# Patient Record
Sex: MALE | Race: WHITE | NOT HISPANIC OR LATINO | Employment: FULL TIME | URBAN - METROPOLITAN AREA
[De-identification: names, ages, dates, MRNs, and addresses within clinical notes are randomized per-mention and may not be internally consistent; named-entity substitution may affect disease eponyms.]

---

## 2019-08-09 ENCOUNTER — HOSPITAL ENCOUNTER (EMERGENCY)
Facility: HOSPITAL | Age: 34
Discharge: HOME | End: 2019-08-09
Attending: EMERGENCY MEDICINE

## 2019-08-09 VITALS
DIASTOLIC BLOOD PRESSURE: 104 MMHG | RESPIRATION RATE: 18 BRPM | WEIGHT: 290 LBS | HEART RATE: 84 BPM | OXYGEN SATURATION: 97 % | TEMPERATURE: 98.1 F | SYSTOLIC BLOOD PRESSURE: 168 MMHG

## 2019-08-09 DIAGNOSIS — M54.50 LOW BACK PAIN: Primary | ICD-10-CM

## 2019-08-09 PROCEDURE — 99284 EMERGENCY DEPT VISIT MOD MDM: CPT | Performed by: EMERGENCY MEDICINE

## 2019-08-09 PROCEDURE — 96372 THER/PROPH/DIAG INJ SC/IM: CPT

## 2019-08-09 PROCEDURE — 99283 EMERGENCY DEPT VISIT LOW MDM: CPT

## 2019-08-09 RX ORDER — METHOCARBAMOL 500 MG/1
500 TABLET, FILM COATED ORAL ONCE
Status: COMPLETED | OUTPATIENT
Start: 2019-08-09 | End: 2019-08-09

## 2019-08-09 RX ORDER — LIDOCAINE 50 MG/G
1 PATCH TOPICAL ONCE
Status: DISCONTINUED | OUTPATIENT
Start: 2019-08-09 | End: 2019-08-09 | Stop reason: HOSPADM

## 2019-08-09 RX ORDER — METHOCARBAMOL 500 MG/1
500 TABLET, FILM COATED ORAL 2 TIMES DAILY
Qty: 20 TABLET | Refills: 0 | Status: SHIPPED | OUTPATIENT
Start: 2019-08-09

## 2019-08-09 RX ORDER — KETOROLAC TROMETHAMINE 30 MG/ML
15 INJECTION, SOLUTION INTRAMUSCULAR; INTRAVENOUS ONCE
Status: COMPLETED | OUTPATIENT
Start: 2019-08-09 | End: 2019-08-09

## 2019-08-09 RX ORDER — NAPROXEN 500 MG/1
500 TABLET ORAL 2 TIMES DAILY WITH MEALS
Qty: 30 TABLET | Refills: 0 | Status: SHIPPED | OUTPATIENT
Start: 2019-08-09

## 2019-08-09 RX ADMIN — KETOROLAC TROMETHAMINE 15 MG: 30 INJECTION, SOLUTION INTRAMUSCULAR at 17:35

## 2019-08-09 RX ADMIN — LIDOCAINE 1 PATCH: 50 PATCH CUTANEOUS at 17:35

## 2019-08-09 RX ADMIN — METHOCARBAMOL TABLETS 500 MG: 500 TABLET, COATED ORAL at 17:35

## 2019-08-09 NOTE — ED PROVIDER NOTES
History  Chief Complaint   Patient presents with    Back Pain     2d ago, at work, heard a "pop in his back" which has gotten worse  C/O lower back pain     27 yo man presents with 3 days of right lower back pain  Started after bending over  Electric shock down posterior right leg to ankle  History of bulging disc  Denies fever, lower extremity weakness, difficulty urinating, saddle anesthesia, blood thinners, chronic steroids, history of traumatic fracture, IV drug abuse  Not relieved with tylenol, motrin, aleve  No f/c/n/v/d  Back Pain   Associated symptoms: no abdominal pain, no chest pain, no dysuria, no fever and no headaches        None       History reviewed  No pertinent past medical history  History reviewed  No pertinent surgical history  History reviewed  No pertinent family history  I have reviewed and agree with the history as documented  Social History     Tobacco Use    Smoking status: Current Every Day Smoker     Types: Cigarettes    Smokeless tobacco: Never Used   Substance Use Topics    Alcohol use: Not Currently    Drug use: Not Currently        Review of Systems   Constitutional: Negative for appetite change, chills, diaphoresis, fatigue and fever  HENT: Negative for congestion, rhinorrhea and sore throat  Respiratory: Negative for apnea, cough, choking, chest tightness, shortness of breath, wheezing and stridor  Cardiovascular: Negative for chest pain, palpitations and leg swelling  Gastrointestinal: Negative for abdominal distention, abdominal pain, constipation, diarrhea, nausea and vomiting  Genitourinary: Negative for dysuria and hematuria  Musculoskeletal: Positive for back pain  Negative for neck pain and neck stiffness  Skin: Negative for pallor, rash and wound  Neurological: Negative for dizziness, light-headedness and headaches  Psychiatric/Behavioral: Negative for behavioral problems and confusion     All other systems reviewed and are negative  Physical Exam  ED Triage Vitals   Temperature Pulse Respirations Blood Pressure SpO2   08/09/19 1601 08/09/19 1601 08/09/19 1601 08/09/19 1601 08/09/19 1601   98 1 °F (36 7 °C) 84 18 (!) 168/104 97 %      Temp src Heart Rate Source Patient Position - Orthostatic VS BP Location FiO2 (%)   -- -- -- -- --             Pain Score       08/09/19 1559       Worst Possible Pain             Orthostatic Vital Signs  Vitals:    08/09/19 1601   BP: (!) 168/104   Pulse: 84       Physical Exam   Constitutional: He is oriented to person, place, and time  He appears well-developed and well-nourished  No distress  HENT:   Head: Normocephalic and atraumatic  Eyes: Pupils are equal, round, and reactive to light  Conjunctivae and EOM are normal  No scleral icterus  Neck: Normal range of motion  Neck supple  Cardiovascular: Normal rate, regular rhythm and normal heart sounds  No murmur heard  Pulmonary/Chest: Effort normal and breath sounds normal  No respiratory distress  He has no wheezes  Abdominal: Soft  Bowel sounds are normal  He exhibits no distension  There is no tenderness  Musculoskeletal: Normal range of motion  He exhibits no edema  No midline C, T, L-spine tenderness  Point tenderness over right lumbar paraspinal musculature  No spasm  Positive ipsalateral and contralateral straight leg raise test left side  Neurological: He is alert and oriented to person, place, and time  He has normal strength  He displays normal reflexes  No cranial nerve deficit or sensory deficit  He exhibits normal muscle tone  Coordination and gait normal  GCS eye subscore is 4  GCS verbal subscore is 5  GCS motor subscore is 6  Reflex Scores:       Patellar reflexes are 1+ on the right side and 1+ on the left side  Achilles reflexes are 1+ on the right side and 1+ on the left side  Skin: Skin is warm and dry  No rash noted  He is not diaphoretic  Psychiatric: He has a normal mood and affect   His behavior is normal    Nursing note and vitals reviewed  ED Medications  Medications   ketorolac (TORADOL) injection 15 mg (15 mg Intramuscular Given 8/9/19 1735)   methocarbamol (ROBAXIN) tablet 500 mg (500 mg Oral Given 8/9/19 1735)       Diagnostic Studies  Results Reviewed     None                 No orders to display         Procedures  Procedures        ED Course                               MDM  Number of Diagnoses or Management Options  Low back pain: new and requires workup  Diagnosis management comments: Exacerbation of lumbar spine pain  Likely sciatic in etiology  No red flags on history and exam   No indication for imaging at this time  Will treat with Toradol, Lidoderm, Robaxin  Will discharge home with scripts for Naprosyn and Robaxin  Work note provided  Patient referred to comprehensive spine program        Amount and/or Complexity of Data Reviewed  Decide to obtain previous medical records or to obtain history from someone other than the patient: yes  Obtain history from someone other than the patient: yes  Review and summarize past medical records: yes  Discuss the patient with other providers: yes  Independent visualization of images, tracings, or specimens: yes    Risk of Complications, Morbidity, and/or Mortality  Presenting problems: minimal  Diagnostic procedures: minimal  Management options: minimal    Patient Progress  Patient progress: stable      Disposition  Final diagnoses:   Low back pain     Time reflects when diagnosis was documented in both MDM as applicable and the Disposition within this note     Time User Action Codes Description Comment    8/9/2019  5:31 PM Liu Goldstein Add [M54 5] Low back pain       ED Disposition     ED Disposition Condition Date/Time Comment    Discharge Stable Fri Aug 9, 2019  5:31 PM Justice Kimball discharge to home/self care              Follow-up Information     Follow up With Specialties Details Why Contact Info    Ambrocio Jovel DO Family Medicine   [address] [address]  [phone]            Discharge Medication List as of 8/9/2019  5:34 PM      START taking these medications    Details   methocarbamol (ROBAXIN) 500 mg tablet Take 1 tablet (500 mg total) by mouth 2 (two) times a day, Starting Fri 8/9/2019, Print      naproxen (NAPROSYN) 500 mg tablet Take 1 tablet (500 mg total) by mouth 2 (two) times a day with meals, Starting Fri 8/9/2019, Print               ED Provider  Attending physically available and evaluated Jose Hassan I managed the patient along with the ED Attending      Electronically Signed by         Luc Kent MD  08/10/19 8757

## 2019-08-10 NOTE — ED ATTENDING ATTESTATION
Alyssa Gonzales DO, saw and evaluated the patient  I have discussed the patient with the resident/non-physician practitioner and agree with the resident's/non-physician practitioner's findings, Plan of Care, and MDM as documented in the resident's/non-physician practitioner's note, except where noted  All available labs and Radiology studies were reviewed  I was present for key portions of any procedure(s) performed by the resident/non-physician practitioner and I was immediately available to provide assistance  At this point I agree with the current assessment done in the Emergency Department  I have conducted an independent evaluation of this patient a history and physical is as follows:      SUBJECTIVE:   Gabrielle Busyb is a 28 y o  male who complains of an injury causing low back pain 3 day(s) ago  The pain is positional with bending or lifting, with radiation down the legs  Mechanism of injury: bending over at work  Symptoms have been acute since that time  Prior history of back problems: recurrent self limited episodes of low back pain in the past and previous herniated disc  There is no numbness in the legs  OBJECTIVE:  Vital signs as noted above  Patient appears to be in mild to moderate pain,  Lumbosacral spine area reveals no local tenderness or mass  Painful and reduced LS ROM noted  Straight leg raise is positive at 70 degrees on right, left  DTR's, motor strength and sensation normal, including heel and toe gait  Peripheral pulses are palpable  Lumbar spine X-Ray: not indicated  ASSESSMENT:   lumbar strain and herniated disc likely at L4-5    PLAN:  For acute pain, rest, intermittent application of cold packs (later, may switch to heat, but do not sleep on heating pad), analgesics and muscle relaxants are recommended  Discussed longer term treatment plan of prn NSAID's and discussed a home back care exercise program with flexion exercise routine   Proper lifting with avoidance of heavy lifting discussed  Consider Physical Therapy and XRay studies if not improving  Call or return to clinic prn if these symptoms worsen or fail to improve as anticipated        Critical Care Time  Procedures

## 2019-08-12 ENCOUNTER — TELEPHONE (OUTPATIENT)
Dept: PHYSICAL THERAPY | Facility: OTHER | Age: 34
End: 2019-08-12

## 2019-08-12 NOTE — TELEPHONE ENCOUNTER
Message left for Pt to call us back  Our ph # and hours of business provided  Waiting for return call from Pt      Referral closed

## 2023-12-28 ENCOUNTER — HOSPITAL ENCOUNTER (EMERGENCY)
Facility: HOSPITAL | Age: 38
Discharge: HOME | End: 2023-12-28
Attending: EMERGENCY MEDICINE

## 2023-12-28 VITALS
TEMPERATURE: 97.3 F | OXYGEN SATURATION: 97 % | SYSTOLIC BLOOD PRESSURE: 165 MMHG | RESPIRATION RATE: 17 BRPM | DIASTOLIC BLOOD PRESSURE: 98 MMHG | HEART RATE: 80 BPM

## 2023-12-28 DIAGNOSIS — K08.89 PAIN, DENTAL: Primary | ICD-10-CM

## 2023-12-28 PROCEDURE — 99284 EMERGENCY DEPT VISIT MOD MDM: CPT | Performed by: EMERGENCY MEDICINE

## 2023-12-28 PROCEDURE — 99282 EMERGENCY DEPT VISIT SF MDM: CPT

## 2023-12-28 PROCEDURE — 64400 NJX AA&/STRD TRIGEMINAL NRV: CPT | Performed by: EMERGENCY MEDICINE

## 2023-12-28 RX ORDER — CHLORHEXIDINE GLUCONATE ORAL RINSE 1.2 MG/ML
15 SOLUTION DENTAL 2 TIMES DAILY
Qty: 120 ML | Refills: 0 | Status: SHIPPED | OUTPATIENT
Start: 2023-12-28

## 2023-12-28 RX ORDER — CHLORHEXIDINE GLUCONATE ORAL RINSE 1.2 MG/ML
15 SOLUTION DENTAL 2 TIMES DAILY
Qty: 120 ML | Refills: 0 | Status: SHIPPED | OUTPATIENT
Start: 2023-12-28 | End: 2023-12-28

## 2023-12-28 RX ORDER — CHLORHEXIDINE GLUCONATE ORAL RINSE 1.2 MG/ML
15 SOLUTION DENTAL ONCE
Status: COMPLETED | OUTPATIENT
Start: 2023-12-28 | End: 2023-12-28

## 2023-12-28 RX ORDER — PENICILLIN V POTASSIUM 500 MG/1
500 TABLET ORAL 4 TIMES DAILY
Qty: 40 TABLET | Refills: 0 | Status: SHIPPED | OUTPATIENT
Start: 2023-12-28 | End: 2023-12-28

## 2023-12-28 RX ORDER — ROPIVACAINE HYDROCHLORIDE 5 MG/ML
15 INJECTION, SOLUTION EPIDURAL; INFILTRATION; PERINEURAL ONCE
Status: COMPLETED | OUTPATIENT
Start: 2023-12-28 | End: 2023-12-28

## 2023-12-28 RX ORDER — PENICILLIN V POTASSIUM 500 MG/1
500 TABLET ORAL 4 TIMES DAILY
Qty: 28 TABLET | Refills: 0 | Status: SHIPPED | OUTPATIENT
Start: 2023-12-28 | End: 2024-01-04

## 2023-12-28 RX ORDER — PENICILLIN V POTASSIUM 500 MG/1
500 TABLET ORAL ONCE
Status: COMPLETED | OUTPATIENT
Start: 2023-12-28 | End: 2023-12-28

## 2023-12-28 RX ADMIN — CHLORHEXIDINE GLUCONATE 15 ML: 1.2 SOLUTION ORAL at 21:16

## 2023-12-28 RX ADMIN — ROPIVACAINE HYDROCHLORIDE 15 ML: 5 INJECTION, SOLUTION EPIDURAL; INFILTRATION; PERINEURAL at 21:17

## 2023-12-28 RX ADMIN — PENICILLIN V POTASSIUM 500 MG: 500 TABLET, FILM COATED ORAL at 21:16

## 2023-12-29 NOTE — DISCHARGE INSTRUCTIONS
Start taking the penicillin and peridex as prescribed.     Follow up with dental clinic as soon as possible.     Schedule an appointment with the oral surgery team.    If you develop new or worsening symptoms, please return to the Emergency Department for further evaluation.

## 2023-12-29 NOTE — ED PROVIDER NOTES
History  Chief Complaint   Patient presents with    Dental Pain     Pt reports toothache for couple days, unable to sleep, no relief with tylenol and motrin     HPI    Patient is a 39 y/o M presenting with acute R upper dental pain. Pt states about a year ago he had a tooth chip but has been doing ok. 1 month ago he states he had an abscess in the R upper dental area which spontaneously resolved. Now for the past 2-3 days has severe R upper dental pain. There are about 3 teeth that are affected. No facial swelling, difficulty breathing. Trying tylenol/motrin, can't sleep, no pain relief. Only thing that helps is drinking water. Denies fever, chills, cp, sob, n/v/d.     Prior to Admission Medications   Prescriptions Last Dose Informant Patient Reported? Taking?   methocarbamol (ROBAXIN) 500 mg tablet   No No   Sig: Take 1 tablet (500 mg total) by mouth 2 (two) times a day   naproxen (NAPROSYN) 500 mg tablet   No No   Sig: Take 1 tablet (500 mg total) by mouth 2 (two) times a day with meals      Facility-Administered Medications: None       History reviewed. No pertinent past medical history.    History reviewed. No pertinent surgical history.    History reviewed. No pertinent family history.  I have reviewed and agree with the history as documented.    E-Cigarette/Vaping     E-Cigarette/Vaping Substances     Social History     Tobacco Use    Smoking status: Every Day     Types: Cigarettes    Smokeless tobacco: Never   Substance Use Topics    Alcohol use: Not Currently    Drug use: Not Currently        Review of Systems   Constitutional:  Negative for chills and fever.   HENT:  Positive for dental problem. Negative for ear pain and sore throat.    Eyes:  Negative for pain and visual disturbance.   Respiratory:  Negative for cough and shortness of breath.    Cardiovascular:  Negative for chest pain and palpitations.   Gastrointestinal:  Negative for abdominal pain and vomiting.   Genitourinary:  Negative for dysuria  and hematuria.   Musculoskeletal:  Negative for arthralgias and back pain.   Skin:  Negative for color change and rash.   Neurological:  Negative for seizures and syncope.   All other systems reviewed and are negative.      Physical Exam  ED Triage Vitals [12/28/23 2032]   Temperature Pulse Respirations Blood Pressure SpO2   (!) 97.3 °F (36.3 °C) 80 17 165/98 97 %      Temp Source Heart Rate Source Patient Position - Orthostatic VS BP Location FiO2 (%)   Temporal Monitor -- -- --      Pain Score       8             Orthostatic Vital Signs  Vitals:    12/28/23 2032   BP: 165/98   Pulse: 80       Physical Exam  Vitals and nursing note reviewed.   Constitutional:       General: He is not in acute distress.     Appearance: He is well-developed. He is not toxic-appearing.   HENT:      Head: Normocephalic and atraumatic.      Right Ear: External ear normal.      Left Ear: External ear normal.      Nose: Nose normal.      Mouth/Throat:      Mouth: No angioedema.      Dentition: Abnormal dentition. Dental caries present.      Pharynx: No oropharyngeal exudate or posterior oropharyngeal erythema.      Tonsils: No tonsillar exudate.     Eyes:      Extraocular Movements: Extraocular movements intact.      Conjunctiva/sclera: Conjunctivae normal.      Pupils: Pupils are equal, round, and reactive to light.   Cardiovascular:      Rate and Rhythm: Normal rate and regular rhythm.      Pulses: Normal pulses.      Heart sounds: Normal heart sounds. No murmur heard.     No friction rub. No gallop.   Pulmonary:      Effort: Pulmonary effort is normal. No respiratory distress.      Breath sounds: Normal breath sounds. No wheezing, rhonchi or rales.   Abdominal:      General: Abdomen is flat.      Palpations: Abdomen is soft.      Tenderness: There is no abdominal tenderness. There is no guarding or rebound.   Musculoskeletal:         General: Normal range of motion.      Cervical back: Normal range of motion. No rigidity.      Right  "lower leg: No edema.      Left lower leg: No edema.   Skin:     General: Skin is warm and dry.      Capillary Refill: Capillary refill takes less than 2 seconds.   Neurological:      General: No focal deficit present.      Mental Status: He is alert.   Psychiatric:         Mood and Affect: Mood normal.         ED Medications  Medications   penicillin V potassium (VEETID) tablet 500 mg (500 mg Oral Given 12/28/23 2116)   chlorhexidine (PERIDEX) 0.12 % oral rinse 15 mL (15 mL Swish & Spit Given 12/28/23 2116)   ropivacaine (NAROPIN) 0.5 % injection 15 mL (15 mL Infiltration Given by Other 12/28/23 2117)       Diagnostic Studies  Results Reviewed       None                   No orders to display         Procedures  Nerve block    Date/Time: 12/28/2023 9:38 PM    Performed by: Dev Devries MD  Authorized by: Dev Devries MD  Universal Protocol:  Consent: Verbal consent obtained.  Risks and benefits: risks, benefits and alternatives were discussed  Consent given by: patient  Time out: Immediately prior to procedure a \"time out\" was called to verify the correct patient, procedure, equipment, support staff and site/side marked as required.  Timeout called at: 12/28/2023 9:38 PM.  Patient understanding: patient states understanding of the procedure being performed  Patient consent: the patient's understanding of the procedure matches consent given  Procedure consent: procedure consent matches procedure scheduled  Patient identity confirmed: verbally with patient and arm band    Indications:     Indications:  Pain relief  Location:     Body area:  Head    Head nerve blocked: superior alveolar R.    Laterality:  Right  Pre-procedure details:     Skin preparation:  2% chlorhexidine  Procedure details (see MAR for exact dosages):     Block needle gauge:  24 G    Anesthetic injected:  Ropivacaine 0.5%    Steroid injected:  None    Additive injected:  None    Injection procedure:  Anatomic landmarks identified, anatomic " landmarks palpated, introduced needle, incremental injection and negative aspiration for blood  Post-procedure details:     Dressing:  None    Outcome:  Pain relieved    Patient tolerance of procedure:  Tolerated well, no immediate complications        ED Course                             SBIRT 20yo+      Flowsheet Row Most Recent Value   Initial Alcohol Screen: US AUDIT-C     1. How often do you have a drink containing alcohol? 0 Filed at: 12/28/2023 2032   2. How many drinks containing alcohol do you have on a typical day you are drinking?  0 Filed at: 12/28/2023 2032   3a. Male UNDER 65: How often do you have five or more drinks on one occasion? 0 Filed at: 12/28/2023 2032   Audit-C Score 0 Filed at: 12/28/2023 2032   CECILIA: How many times in the past year have you...    Used an illegal drug or used a prescription medication for non-medical reasons? Never Filed at: 12/28/2023 2032                  Medical Decision Making  39 y/o M presenting for evaluation of dental pain. VSS. No evidence of deep space infection or dental abscess. Will start penicillin and rec peridex rinses. Pt requesting dental block. Block performed with analgesia achieved. Dental clinic info given, referred for oral surgery. Discussed strict return precautions.     Risk  Prescription drug management.          Disposition  Final diagnoses:   Pain, dental     Time reflects when diagnosis was documented in both MDM as applicable and the Disposition within this note       Time User Action Codes Description Comment    12/28/2023  9:20 PM Dev Devries Add [K08.89] Pain, dental           ED Disposition       ED Disposition   Discharge    Condition   Stable    Date/Time   Thu Dec 28, 2023 2120    Comment   Timi Abebe discharge to home/self care.                   Follow-up Information       Follow up With Specialties Details Why Contact Info    Dave Stratton DO Family Medicine   [address] [place] [address]  [phone]       [facility] [place]  Schedule an appointment as soon as possible for a visit   [address] [place] [address]  [phone]            Discharge Medication List as of 12/28/2023  9:26 PM        START taking these medications    Details   chlorhexidine (PERIDEX) 0.12 % solution Apply 15 mL to the mouth or throat 2 (two) times a day, Starting Thu 12/28/2023, Normal      penicillin V potassium (VEETID) 500 mg tablet Take 1 tablet (500 mg total) by mouth 4 (four) times a day for 7 days, Starting Thu 12/28/2023, Until Thu 1/4/2024, Normal           CONTINUE these medications which have NOT CHANGED    Details   methocarbamol (ROBAXIN) 500 mg tablet Take 1 tablet (500 mg total) by mouth 2 (two) times a day, Starting Fri 8/9/2019, Print      naproxen (NAPROSYN) 500 mg tablet Take 1 tablet (500 mg total) by mouth 2 (two) times a day with meals, Starting Fri 8/9/2019, Print               PDMP Review       None             ED Provider  Attending physically available and evaluated Timi AZAM Abebe. I managed the patient along with the ED Attending.    Electronically Signed by           Dev Devries MD  12/28/23 6221

## 2023-12-30 NOTE — ED ATTENDING ATTESTATION
12/28/2023  IDre MD, saw and evaluated the patient. I have discussed the patient with the resident/non-physician practitioner and agree with the resident's/non-physician practitioner's findings, Plan of Care, and MDM as documented in the resident's/non-physician practitioner's note, except where noted. All available labs and Radiology studies were reviewed.  I was present for key portions of any procedure(s) performed by the resident/non-physician practitioner and I was immediately available to provide assistance.       At this point I agree with the current assessment done in the Emergency Department.  I have conducted an independent evaluation of this patient a history and physical is as follows:    ED Course     Impression: Dental pain (right upper jaw.  History of poor dentition)  Differential diagnosis periapical abscess, dental fracture, dental caries doubt deep space infection of head and neck doubt Ludewig's angina, doubt vascular space abscess.    Plan to perform dental block for analgesia, will treat with Pen-Vee K chlorhexidine follow-up with dental as outpatient    Patient underwent right superior alveolar nerve block tolerated procedure well without immediate complications.  Patient be discharged home on chlorhexidine and penicillin for dental pain.  Patient was counseled to take Tyle Motrin as needed for pain.  Patient was provided a list of dentists in the area at request.    Return precautions given.    Critical Care Time  Procedures

## 2024-03-29 ENCOUNTER — HOSPITAL ENCOUNTER (EMERGENCY)
Facility: HOSPITAL | Age: 39
Discharge: HOME | End: 2024-03-29
Attending: EMERGENCY MEDICINE

## 2024-03-29 VITALS
HEART RATE: 93 BPM | HEIGHT: 78 IN | TEMPERATURE: 97.4 F | OXYGEN SATURATION: 98 % | WEIGHT: 290 LBS | SYSTOLIC BLOOD PRESSURE: 183 MMHG | BODY MASS INDEX: 33.55 KG/M2 | DIASTOLIC BLOOD PRESSURE: 75 MMHG | RESPIRATION RATE: 18 BRPM

## 2024-03-29 DIAGNOSIS — M25.551 RIGHT HIP PAIN: Primary | ICD-10-CM

## 2024-03-29 PROCEDURE — 99284 EMERGENCY DEPT VISIT MOD MDM: CPT | Performed by: EMERGENCY MEDICINE

## 2024-03-29 PROCEDURE — 99283 EMERGENCY DEPT VISIT LOW MDM: CPT

## 2024-03-29 PROCEDURE — 96372 THER/PROPH/DIAG INJ SC/IM: CPT

## 2024-03-29 RX ORDER — NAPROXEN 500 MG/1
500 TABLET ORAL 2 TIMES DAILY WITH MEALS
Qty: 14 TABLET | Refills: 0 | Status: SHIPPED | OUTPATIENT
Start: 2024-03-29 | End: 2024-04-05

## 2024-03-29 RX ORDER — KETOROLAC TROMETHAMINE 30 MG/ML
15 INJECTION, SOLUTION INTRAMUSCULAR; INTRAVENOUS ONCE
Status: COMPLETED | OUTPATIENT
Start: 2024-03-29 | End: 2024-03-29

## 2024-03-29 RX ADMIN — KETOROLAC TROMETHAMINE 15 MG: 30 INJECTION, SOLUTION INTRAMUSCULAR; INTRAVENOUS at 18:02

## 2024-03-29 NOTE — DISCHARGE INSTRUCTIONS
You were seen in the ED for hip pain. Return to the ED for any worsening symptoms or new symptoms. Follow up with your primary care doctor as soon as possible.  Take tylenol for your symptoms and naproxen.

## 2024-03-29 NOTE — ED PROVIDER NOTES
History  Chief Complaint   Patient presents with    Hip Pain     Pt reports right hip pain for 1 week. Noticed right knee numbness. Pt report throwing back out the previous week.      38-year-old male patient with no past medical history presenting with right hip pain and back pain.  Patient states about 3 weeks ago started having back pain after he worked out.  States that a week ago started having right hip pain.  Dates pain radiates down for his right hip to his knee.  Patient also states that he has some numbness in his right knee.  Denies any urinary or bowel incontinence, weakness in lower extremities, numbness or tingling.  Patient states that he is able to ambulate.  Patient states that he has been taking ibuprofen and Tylenol for symptoms.  Denies any, to the back.  Denies any fevers.        Prior to Admission Medications   Prescriptions Last Dose Informant Patient Reported? Taking?   chlorhexidine (PERIDEX) 0.12 % solution   No No   Sig: Apply 15 mL to the mouth or throat 2 (two) times a day   methocarbamol (ROBAXIN) 500 mg tablet   No No   Sig: Take 1 tablet (500 mg total) by mouth 2 (two) times a day   naproxen (NAPROSYN) 500 mg tablet   No No   Sig: Take 1 tablet (500 mg total) by mouth 2 (two) times a day with meals      Facility-Administered Medications: None       History reviewed. No pertinent past medical history.    History reviewed. No pertinent surgical history.    History reviewed. No pertinent family history.  I have reviewed and agree with the history as documented.    E-Cigarette/Vaping     E-Cigarette/Vaping Substances    Nicotine Yes      Social History     Tobacco Use    Smoking status: Former     Types: Cigarettes    Smokeless tobacco: Never   Substance Use Topics    Alcohol use: Not Currently    Drug use: Not Currently        Review of Systems   Musculoskeletal:  Positive for back pain.        Right hip pain   All other systems reviewed and are negative.      Physical Exam  ED Triage  Vitals [03/29/24 1605]   Temperature Pulse Respirations Blood Pressure SpO2   (!) 97.4 °F (36.3 °C) 93 18 (!) 183/75 98 %      Temp Source Heart Rate Source Patient Position - Orthostatic VS BP Location FiO2 (%)   Temporal Monitor Sitting Left arm --      Pain Score       8             Orthostatic Vital Signs  Vitals:    03/29/24 1605   BP: (!) 183/75   Pulse: 93   Patient Position - Orthostatic VS: Sitting       Physical Exam  Vitals reviewed.   Constitutional:       Appearance: Normal appearance.   HENT:      Head: Normocephalic and atraumatic.      Nose: Nose normal.      Mouth/Throat:      Mouth: Mucous membranes are moist.      Pharynx: Oropharynx is clear.   Eyes:      Extraocular Movements: Extraocular movements intact.      Conjunctiva/sclera: Conjunctivae normal.   Cardiovascular:      Rate and Rhythm: Normal rate and regular rhythm.      Pulses: Normal pulses.      Heart sounds: Normal heart sounds.   Pulmonary:      Effort: Pulmonary effort is normal.      Breath sounds: Normal breath sounds.   Abdominal:      General: Bowel sounds are normal.      Palpations: Abdomen is soft.      Tenderness: There is no abdominal tenderness.   Musculoskeletal:         General: No tenderness (No tenderness to the right lower extremity). Normal range of motion.      Cervical back: Normal range of motion.   Skin:     General: Skin is warm and dry.   Neurological:      General: No focal deficit present.      Mental Status: He is alert and oriented to person, place, and time. Mental status is at baseline.      Motor: No weakness (5 out of 5 strength in bilateral lower extremities).         ED Medications  Medications   ketorolac (TORADOL) injection 15 mg (15 mg Intramuscular Given 3/29/24 1802)       Diagnostic Studies  Results Reviewed       None                   No orders to display         Procedures  Procedures      ED Course                             SBIRT 22yo+      Flowsheet Row Most Recent Value   Initial Alcohol  Screen: US AUDIT-C     1. How often do you have a drink containing alcohol? 0 Filed at: 03/29/2024 1607   Audit-C Score 0 Filed at: 03/29/2024 1607   CECILIA: How many times in the past year have you...    Used an illegal drug or used a prescription medication for non-medical reasons? Never Filed at: 03/29/2024 1607                  Medical Decision Making  38-year-old male patient presenting with right hip pain.  States about a week ago had that back pain after working out.  DDx includes herniated disc, back strain, hip strain.  Patient on exam within normal limits.  Moving all 4 extremities, no numbness or tingling.  Negative for red flags for back pain.  No tenderness on exam.  Patient treated with Toradol.  Stable for discharge with follow-up with [facility].  Return precautions given.    Risk  Prescription drug management.          Disposition  Final diagnoses:   Right hip pain     Time reflects when diagnosis was documented in both MDM as applicable and the Disposition within this note       Time User Action Codes Description Comment    3/29/2024  6:08 PM Abilio Virk Add [M25.551] Right hip pain           ED Disposition       ED Disposition   Discharge    Condition   Stable    Date/Time   Fri Mar 29, 2024 1808    Comment   Timi Abebe discharge to home/self care.                   Follow-up Information       Follow up With Specialties Details Why Contact Info    Dave Stratton,  Family Medicine Schedule an appointment as soon as possible for a visit   [address] [place] [id number] [phone]              Discharge Medication List as of 3/29/2024  6:10 PM        START taking these medications    Details   !! naproxen (Naprosyn) 500 mg tablet Take 1 tablet (500 mg total) by mouth 2 (two) times a day with meals for 7 days, Starting Fri 3/29/2024, Until Fri 4/5/2024, Print       !! - Potential duplicate medications found. Please discuss with provider.        CONTINUE  these medications which have NOT CHANGED    Details   chlorhexidine (PERIDEX) 0.12 % solution Apply 15 mL to the mouth or throat 2 (two) times a day, Starting Thu 12/28/2023, Normal      methocarbamol (ROBAXIN) 500 mg tablet Take 1 tablet (500 mg total) by mouth 2 (two) times a day, Starting Fri 8/9/2019, Print      !! naproxen (NAPROSYN) 500 mg tablet Take 1 tablet (500 mg total) by mouth 2 (two) times a day with meals, Starting Fri 8/9/2019, Print       !! - Potential duplicate medications found. Please discuss with provider.            PDMP Review       None             ED Provider  Attending physically available and evaluated Timi AZAM Abebe. I managed the patient along with the ED Attending.    Electronically Signed by           Abilio Virk MD  03/30/24 0022

## 2024-04-01 ENCOUNTER — TELEPHONE (OUTPATIENT)
Dept: PHYSICAL THERAPY | Facility: OTHER | Age: 39
End: 2024-04-01

## 2024-04-01 NOTE — TELEPHONE ENCOUNTER
Call placed to the patient per [facility] referral.    V/m left for patient to call back. Phone number and hours of business provided.    This is the 1st attempt to reach the patient. Will defer referral per protocol.      H. I ?

## 2024-04-08 NOTE — ED ATTENDING ATTESTATION
3/29/2024  I, Joaquín Dinh DO, saw and evaluated the patient. I have discussed the patient with the resident/non-physician practitioner and agree with the resident's/non-physician practitioner's findings, Plan of Care, and MDM as documented in the resident's/non-physician practitioner's note, except where noted. All available labs and Radiology studies were reviewed.  I was present for key portions of any procedure(s) performed by the resident/non-physician practitioner and I was immediately available to provide assistance.       At this point I agree with the current assessment done in the Emergency Department.  I have conducted an independent evaluation of this patient a history and physical is as follows:    38-year-old male presents for right hip right back pain.  Onset was ago.  Started after working out.  No red flags for cauda equina does not use IV drugs no fevers no concurrent infection anywhere.  Plan to Medicare likely musculoskeletal back strain negative straight leg test so doubt disc disease    ED Course         Critical Care Time  Procedures